# Patient Record
Sex: MALE | Race: WHITE | Employment: FULL TIME | URBAN - METROPOLITAN AREA
[De-identification: names, ages, dates, MRNs, and addresses within clinical notes are randomized per-mention and may not be internally consistent; named-entity substitution may affect disease eponyms.]

---

## 2024-01-04 ENCOUNTER — OFFICE VISIT (OUTPATIENT)
Dept: PRIMARY CARE | Facility: CLINIC | Age: 29
End: 2024-01-04
Payer: COMMERCIAL

## 2024-01-04 VITALS
DIASTOLIC BLOOD PRESSURE: 70 MMHG | SYSTOLIC BLOOD PRESSURE: 116 MMHG | BODY MASS INDEX: 27.06 KG/M2 | WEIGHT: 189 LBS | OXYGEN SATURATION: 97 % | HEIGHT: 70 IN | HEART RATE: 80 BPM

## 2024-01-04 DIAGNOSIS — N50.89 TESTICLE LUMP: ICD-10-CM

## 2024-01-04 DIAGNOSIS — R59.0 SUBMANDIBULAR LYMPHADENOPATHY: Primary | ICD-10-CM

## 2024-01-04 PROCEDURE — 99213 OFFICE O/P EST LOW 20 MIN: CPT | Performed by: STUDENT IN AN ORGANIZED HEALTH CARE EDUCATION/TRAINING PROGRAM

## 2024-01-04 RX ORDER — CETIRIZINE HYDROCHLORIDE 10 MG/1
TABLET ORAL EVERY 24 HOURS
COMMUNITY

## 2024-01-04 RX ORDER — ACETAMINOPHEN 325 MG/1
TABLET ORAL EVERY 4 HOURS
COMMUNITY

## 2024-01-04 RX ORDER — PENICILLIN V POTASSIUM 500 MG/1
TABLET, FILM COATED ORAL
COMMUNITY
Start: 2023-12-31

## 2024-01-04 RX ORDER — CHLORHEXIDINE GLUCONATE ORAL RINSE 1.2 MG/ML
SOLUTION DENTAL
COMMUNITY
Start: 2023-12-31

## 2024-01-04 ASSESSMENT — COLUMBIA-SUICIDE SEVERITY RATING SCALE - C-SSRS
6. HAVE YOU EVER DONE ANYTHING, STARTED TO DO ANYTHING, OR PREPARED TO DO ANYTHING TO END YOUR LIFE?: NO
1. IN THE PAST MONTH, HAVE YOU WISHED YOU WERE DEAD OR WISHED YOU COULD GO TO SLEEP AND NOT WAKE UP?: NO
2. HAVE YOU ACTUALLY HAD ANY THOUGHTS OF KILLING YOURSELF?: NO

## 2024-01-04 ASSESSMENT — PAIN SCALES - GENERAL: PAINLEVEL: 0-NO PAIN

## 2024-01-04 ASSESSMENT — PATIENT HEALTH QUESTIONNAIRE - PHQ9
2. FEELING DOWN, DEPRESSED OR HOPELESS: NOT AT ALL
1. LITTLE INTEREST OR PLEASURE IN DOING THINGS: NOT AT ALL
SUM OF ALL RESPONSES TO PHQ9 QUESTIONS 1 AND 2: 0

## 2024-01-04 NOTE — PROGRESS NOTES
"Subjective   Patient ID: Villa Avila is a 28 y.o. male who presents for swollen lymph nodes in neck and small lump in testicle.    HPI.  27 yo male here for swollen lymph nodes in neck and lump in testicle  Swollen lymph node in R and midline neck  Took antibiotics, improved somewhat  Now enlarging more  No tooth infection  Did have dental filling done in August on that side  Lymph nodes somewhat tender  Does smoke  2. B/L lumps in testicles  Notices in morning  Nonpainful  For a couple months  Seems like it's on the vein    Review of Systems  All pertinent positive symptoms are included in the history of present illness.    All other systems have been reviewed and are negative and noncontributory to this patient's current ailments.     No Known Allergies       There is no immunization history on file for this patient.    Objective   Vitals:    01/04/24 1456   BP: 116/70   Pulse: 80   SpO2: 97%   Weight: 85.7 kg (189 lb)   Height: 1.778 m (5' 10\")       Physical Exam  CONSTITUTIONAL - well nourished, well developed, looks like stated age, in no acute distress  SKIN - normal skin color and pigmentation, normal skin turgor without rash  HEAD - Submandibular lymph nodes palpated on right and midline  EYES - extraocular muscles are intact  ENT - atraumatic  NECK - no neck mass was observed  LUNGS - CTA B/L  CARDIAC - regular rate and regular rhythm, no skipped beats, no murmur appreciated  ABDOMEN - no organomegaly, soft, nontender, nondistended   - .5cm nodules palpated in testicles B/L  EXTREMITIES - no edema, no deformities  MSK - moves limbs equally  NEUROLOGICAL - normal gait, normal balance, alert, oriented and no focal signs  PSYCHIATRIC - alert, pleasant and cordial, age-appropriate    Assessment/Plan   27 yo male here for swollen lymph nodes in neck and lump in testicle  Swollen lymph node in R and midline neck  Head and neck US  2. B/L lumps in testicles, likely varicoceles  Scrotal US    RTC as " needed

## 2024-01-05 ENCOUNTER — HOSPITAL ENCOUNTER (OUTPATIENT)
Dept: RADIOLOGY | Facility: HOSPITAL | Age: 29
Discharge: HOME | End: 2024-01-05
Payer: COMMERCIAL

## 2024-01-05 ENCOUNTER — TELEPHONE (OUTPATIENT)
Dept: PRIMARY CARE | Facility: CLINIC | Age: 29
End: 2024-01-05
Payer: COMMERCIAL

## 2024-01-05 ENCOUNTER — APPOINTMENT (OUTPATIENT)
Dept: PRIMARY CARE | Facility: CLINIC | Age: 29
End: 2024-01-05
Payer: COMMERCIAL

## 2024-01-05 DIAGNOSIS — N50.89 TESTICLE LUMP: ICD-10-CM

## 2024-01-05 DIAGNOSIS — R59.0 SUBMANDIBULAR LYMPHADENOPATHY: ICD-10-CM

## 2024-01-05 PROCEDURE — 93975 VASCULAR STUDY: CPT

## 2024-01-05 PROCEDURE — 76536 US EXAM OF HEAD AND NECK: CPT

## 2024-01-09 ENCOUNTER — TELEMEDICINE (OUTPATIENT)
Dept: PRIMARY CARE | Facility: CLINIC | Age: 29
End: 2024-01-09
Payer: COMMERCIAL

## 2024-01-09 VITALS — BODY MASS INDEX: 27.12 KG/M2 | WEIGHT: 189 LBS

## 2024-01-09 DIAGNOSIS — R10.11 RIGHT UPPER QUADRANT PAIN: Primary | ICD-10-CM

## 2024-01-09 PROCEDURE — 99213 OFFICE O/P EST LOW 20 MIN: CPT | Performed by: STUDENT IN AN ORGANIZED HEALTH CARE EDUCATION/TRAINING PROGRAM

## 2024-01-09 ASSESSMENT — PATIENT HEALTH QUESTIONNAIRE - PHQ9
SUM OF ALL RESPONSES TO PHQ9 QUESTIONS 1 AND 2: 0
1. LITTLE INTEREST OR PLEASURE IN DOING THINGS: NOT AT ALL
2. FEELING DOWN, DEPRESSED OR HOPELESS: NOT AT ALL

## 2024-01-09 ASSESSMENT — PAIN SCALES - GENERAL: PAINLEVEL: 4

## 2024-01-09 NOTE — PROGRESS NOTES
"Subjective   Patient ID: Villa Avila is a 28 y.o. male who presents virtually for Breast Mass (Pt would like to discuss the lumps on the right side of rib cage, which he noticed for a couple of months, which is tender to the touch  / nr).    HPI  29 yo male presents virtually for breast mass  Lump on bottom right of ribcage  Noticed for a couple months  Tender to touch  Feels a bump  Gets a \"jolt\" when he hits his side on something  Feels like its on the skin above rib  Feels soft like a cyst  No nausea/vomiting  For a couple months  Short, self limiting pain  Notices about once per week    Review of Systems  All pertinent positive symptoms are included in the history of present illness.    All other systems have been reviewed and are negative and noncontributory to this patient's current ailments.     No Known Allergies       There is no immunization history on file for this patient.    Objective   Vitals:    01/09/24 1022   Weight: 85.7 kg (189 lb)       Physical Exam Virtual visit  CONSTITUTIONAL - well nourished, well developed, looks like stated age, in no acute distress, not ill-appearing, and not tired appearing  SKIN - normal skin color and pigmentation, normal skin turgor without rash, lesions, or nodules visualized  HEAD - no trauma, normocephalic  ENT - atraumatic  CHEST - non labored breathing  PSYCHIATRIC - alert, pleasant and cordial, age-appropriate    Assessment/Plan   29 yo male presents virtually for breast mass  RUQ pain  RUQ ultrasound    RTC as needed  "

## 2024-01-12 ENCOUNTER — HOSPITAL ENCOUNTER (OUTPATIENT)
Dept: RADIOLOGY | Facility: HOSPITAL | Age: 29
Discharge: HOME | End: 2024-01-12
Payer: COMMERCIAL

## 2024-01-12 ENCOUNTER — TELEPHONE (OUTPATIENT)
Dept: PRIMARY CARE | Facility: CLINIC | Age: 29
End: 2024-01-12
Payer: COMMERCIAL

## 2024-01-12 DIAGNOSIS — R10.11 RIGHT UPPER QUADRANT PAIN: ICD-10-CM

## 2024-01-12 PROCEDURE — 76705 ECHO EXAM OF ABDOMEN: CPT

## 2024-07-05 ENCOUNTER — OFFICE VISIT (OUTPATIENT)
Dept: PRIMARY CARE | Facility: CLINIC | Age: 29
End: 2024-07-05
Payer: COMMERCIAL

## 2024-07-05 VITALS
OXYGEN SATURATION: 98 % | BODY MASS INDEX: 28.01 KG/M2 | SYSTOLIC BLOOD PRESSURE: 114 MMHG | DIASTOLIC BLOOD PRESSURE: 68 MMHG | WEIGHT: 195.2 LBS | HEART RATE: 75 BPM

## 2024-07-05 DIAGNOSIS — R42 DIZZINESS: Primary | ICD-10-CM

## 2024-07-05 DIAGNOSIS — J30.89 ALLERGIC RHINITIS DUE TO OTHER ALLERGIC TRIGGER, UNSPECIFIED SEASONALITY: ICD-10-CM

## 2024-07-05 PROCEDURE — 99214 OFFICE O/P EST MOD 30 MIN: CPT | Performed by: PHYSICIAN ASSISTANT

## 2024-07-05 PROCEDURE — 4004F PT TOBACCO SCREEN RCVD TLK: CPT | Performed by: PHYSICIAN ASSISTANT

## 2024-07-05 ASSESSMENT — ENCOUNTER SYMPTOMS
DIZZINESS: 1
SHORTNESS OF BREATH: 0
PALPITATIONS: 0

## 2024-07-05 ASSESSMENT — PATIENT HEALTH QUESTIONNAIRE - PHQ9
SUM OF ALL RESPONSES TO PHQ9 QUESTIONS 1 AND 2: 0
2. FEELING DOWN, DEPRESSED OR HOPELESS: NOT AT ALL
1. LITTLE INTEREST OR PLEASURE IN DOING THINGS: NOT AT ALL

## 2024-07-05 ASSESSMENT — COLUMBIA-SUICIDE SEVERITY RATING SCALE - C-SSRS
2. HAVE YOU ACTUALLY HAD ANY THOUGHTS OF KILLING YOURSELF?: NO
6. HAVE YOU EVER DONE ANYTHING, STARTED TO DO ANYTHING, OR PREPARED TO DO ANYTHING TO END YOUR LIFE?: NO
1. IN THE PAST MONTH, HAVE YOU WISHED YOU WERE DEAD OR WISHED YOU COULD GO TO SLEEP AND NOT WAKE UP?: NO

## 2024-07-05 ASSESSMENT — PAIN SCALES - GENERAL: PAINLEVEL: 0-NO PAIN

## 2024-07-05 NOTE — PROGRESS NOTES
Subjective   Patient ID: Villa Avila is a 28 y.o. male who presents for Dizziness (Pt is here to discuss dizziness, that lasts all day, since Sunday / nr).    29 y/o male presents for c/o dizziness and light headedness for 5 days. Has been constant and does have some some light sensitivity. Has been working without issue. Has had some balance issues but no falls or issues driving. Did have one headache since resolved. Has had a lot of allergies lately as well and admits to spinning room with position change.     Dizziness  Associated symptoms include congestion. Pertinent negatives include no chest pain.        Review of Systems   HENT:  Positive for congestion.    Respiratory:  Negative for shortness of breath.    Cardiovascular:  Negative for chest pain and palpitations.   Neurological:  Positive for dizziness.   All other systems reviewed and are negative.      Objective   /68   Pulse 75   Wt 88.5 kg (195 lb 3.2 oz)   SpO2 98%   BMI 28.01 kg/m²     Physical Exam  Constitutional:       Appearance: Normal appearance.   HENT:      Right Ear: Tympanic membrane normal.      Left Ear: Tympanic membrane normal.      Nose: Congestion present.   Eyes:      Pupils: Pupils are equal, round, and reactive to light.   Cardiovascular:      Rate and Rhythm: Normal rate and regular rhythm.      Heart sounds: Normal heart sounds.   Pulmonary:      Breath sounds: Normal breath sounds.   Neurological:      Mental Status: He is alert.         Assessment/Plan   Diagnoses and all orders for this visit:  Dizziness  Allergic rhinitis due to other allergic trigger, unspecified seasonality    Continue antihistamines, recommend adding steroid nasal spray. Consider Epley maneuvers if not improving.

## 2024-07-17 ENCOUNTER — OFFICE VISIT (OUTPATIENT)
Dept: PRIMARY CARE | Facility: CLINIC | Age: 29
End: 2024-07-17
Payer: COMMERCIAL

## 2024-07-17 VITALS
OXYGEN SATURATION: 97 % | WEIGHT: 200 LBS | SYSTOLIC BLOOD PRESSURE: 132 MMHG | HEART RATE: 90 BPM | HEIGHT: 70 IN | BODY MASS INDEX: 28.63 KG/M2 | DIASTOLIC BLOOD PRESSURE: 76 MMHG

## 2024-07-17 DIAGNOSIS — R42 VERTIGO: Primary | ICD-10-CM

## 2024-07-17 DIAGNOSIS — H69.93 DYSFUNCTION OF BOTH EUSTACHIAN TUBES: ICD-10-CM

## 2024-07-17 PROCEDURE — 99214 OFFICE O/P EST MOD 30 MIN: CPT | Performed by: PHYSICIAN ASSISTANT

## 2024-07-17 PROCEDURE — 4004F PT TOBACCO SCREEN RCVD TLK: CPT | Performed by: PHYSICIAN ASSISTANT

## 2024-07-17 PROCEDURE — 3008F BODY MASS INDEX DOCD: CPT | Performed by: PHYSICIAN ASSISTANT

## 2024-07-17 RX ORDER — PREDNISONE 10 MG/1
TABLET ORAL
Qty: 30 TABLET | Refills: 0 | Status: SHIPPED | OUTPATIENT
Start: 2024-07-17 | End: 2024-07-27

## 2024-07-17 ASSESSMENT — PAIN SCALES - GENERAL: PAINLEVEL: 0-NO PAIN

## 2024-07-17 ASSESSMENT — ENCOUNTER SYMPTOMS: DIZZINESS: 1

## 2024-07-17 NOTE — PROGRESS NOTES
"Subjective   Patient ID: Villa Avila is a 28 y.o. male who presents for Dizziness (Patient states he gets dizzy when he stands up and sit down as well when he moves his head in different direction//bp ).    Presents for c/o ongoing dizziness - using nasal spray without much help. Dizziness provoked by position change and head movements.   Denies severe nausea or vomiting.       Dizziness         Review of Systems   Neurological:  Positive for dizziness.   All other systems reviewed and are negative.      Objective   /76 (BP Location: Left arm, Patient Position: Sitting)   Pulse 90   Ht 1.778 m (5' 10\")   Wt 90.7 kg (200 lb)   SpO2 97%   BMI 28.70 kg/m²     Physical Exam  Constitutional:       Appearance: Normal appearance.   HENT:      Right Ear: Tympanic membrane normal.      Left Ear: Tympanic membrane normal.      Ears:      Comments: TM pressure B/L R>L     Nose: Congestion present.   Eyes:      Pupils: Pupils are equal, round, and reactive to light.   Neck:      Vascular: No carotid bruit.   Cardiovascular:      Rate and Rhythm: Normal rate and regular rhythm.   Neurological:      Mental Status: He is alert.         Assessment/Plan   Diagnoses and all orders for this visit:  Vertigo  -     predniSONE (Deltasone) 10 mg tablet; Take 5 tablets (50 mg) by mouth once daily for 2 days, THEN 4 tablets (40 mg) once daily for 2 days, THEN 3 tablets (30 mg) once daily for 2 days, THEN 2 tablets (20 mg) once daily for 2 days, THEN 1 tablet (10 mg) once daily for 2 days.  Dysfunction of both eustachian tubes  -     predniSONE (Deltasone) 10 mg tablet; Take 5 tablets (50 mg) by mouth once daily for 2 days, THEN 4 tablets (40 mg) once daily for 2 days, THEN 3 tablets (30 mg) once daily for 2 days, THEN 2 tablets (20 mg) once daily for 2 days, THEN 1 tablet (10 mg) once daily for 2 days.         "

## 2024-07-25 ENCOUNTER — OFFICE VISIT (OUTPATIENT)
Dept: OTOLARYNGOLOGY | Facility: CLINIC | Age: 29
End: 2024-07-25
Payer: COMMERCIAL

## 2024-07-25 VITALS — BODY MASS INDEX: 28.7 KG/M2 | WEIGHT: 200 LBS

## 2024-07-25 DIAGNOSIS — J31.0 CHRONIC RHINITIS: Primary | ICD-10-CM

## 2024-07-25 DIAGNOSIS — M54.2 NECK PAIN: ICD-10-CM

## 2024-07-25 DIAGNOSIS — R42 VERTIGO: ICD-10-CM

## 2024-07-25 PROCEDURE — 99203 OFFICE O/P NEW LOW 30 MIN: CPT | Performed by: GENERAL PRACTICE

## 2024-07-28 NOTE — PROGRESS NOTES
Otolaryngology - Head and Neck Surgery Outpatient New Patient Visit Note        Assessment/Plan:   Problem List Items Addressed This Visit    None  Visit Diagnoses         Codes    Chronic rhinitis    -  Primary J31.0    Vertigo     R42    Relevant Orders    Referral to Audiology    Cervical +Ocular VEMP    Electronystagmography    V-Hit    Neck pain     M54.2    Relevant Orders    Referral to Physical Therapy            28yoM with recent dizziness for approx 1mo which is gradually improving.   Notes some association with headache as well as neck tension.   Suspect  some component of cervicogenic dizziness and/or atypical migraines with vertigo.  Will refer to PT to aid in management.   Discussed possible audio/VNG and pt would like to proceed, to help rule out peripheral vestibulopathies.     Also with some chronic rhinitis, discussed use of saline irrigations and saline gel to aid in clearance of debris (pt works in dirty/[employer] environment).              Follow up:  -plan for follow up in clinic as needed and after completion of ordered studies    All of the above findings, impressions, treatment planning and follow up plans were discussed with the patient who indicated understanding.  the patient was instructed to contact or return to clinic sooner if symptoms/signs persist or worsen despite the above management.      Renan Mercedes MD  Otolaryngology - Head and Neck Surgery            History Of Present Illness  Villa Avila is a 28 y.o. male presenting for evaluationof dizziness.    Reports >1mo of symptoms of waxing/waning dizziness.   No inciting illness/trauma.    Reports awakening with dizziness, as well a some headache (pressure, diffuse).   Some association with sinus pressure/congestion as well.     Notes dizziness and dysequilibrium more than RS vertigo.  Not presyncope    No associated aural fullness, hearing change or tinnitus.    Reports some history of bothersome neck tension for approx 6mo, and  notes neck tension and neck extension contribute to dizziness.   Notes some light sensitivity with dizziness, but no visual aura or priro hsitory of migraines.    Reports some mild dizziness/vertigo last year, but otherwise no significant prir history.    Reports treatment by PCM with flonase, prednisone as well as meclizine without significant relief.       Reports some chronic rhinitis which is associated with significant dust/debris exposure at work as a .                 Past Medical History  He has no past medical history on file.    Surgical History  He has no past surgical history on file.     Social History  He reports that he has quit smoking. His smoking use included cigarettes. He has a 5 pack-year smoking history. He has never used smokeless tobacco. He reports current alcohol use of about 1.0 standard drink of alcohol per week. No history on file for drug use.    Family History  No family history on file.     Allergies  Patient has no known allergies.    Review of Systems  ROS: Pertinent positives as noted in HPI.    - CONSTITUTIONAL: Does not report weight loss, fever or chills.    - HEENT:   Ear: Does not report tinnitus,  , hearing loss, otalgia, otorrhea  Nose: Does not report  ,  , epistaxis, decreased smell  Throat: Does not report pain, dysphagia, odynophagia  Larynx: Does not report hoarseness,  difficulty breathing, pain with speaking (odynophonia)  Neck: Does not report new masses, pain, swelling  Face: Does not report sinus pain, pressure, swelling, numbness, weakness     - RESPIRATORY: Does not report SOB or cough.    - CV: Does not report palpitations or chest pain.     - GI: Does not report abdominal pain, nausea, vomiting or diarrhea.    - : Does not report dysuria or urinary frequency.    - MSK: Does not report myalgia or joint pain.    - SKIN: Does not report rash or pruritus.    - NEUROLOGICAL: Does not report headache or syncope.    - PSYCHIATRIC: Does not report recent  changes in mood. Does not report anxiety or depression.         Physical Exam:     GENERAL:   Alert & Oriented to person, place and time; Normal affect and appearance. Well developed and well nourished. Conversant & cooperative with examination.     HEAD:   Normocephalic, atraumatic. No sinus tenderness to palpation. Normal parotid bilaterally. Normal facial strength.     NEUROLOGIC:   Cranial nerves II-XII grossly intact, gait WNL. Normal mood and affect.    EYES:   Extraocular movements intact. Pupils equal, round, reactive to light and accommodation. No nystagmus, no ptosis. no scleral injection.    EAR:   Normal auricle. No discomfort or TTP with manipulation.   Handheld otoscopic exam showed normal external auditory canals bilaterally. No purulence or EAC inflammation. Minimal cerumen.   Right tympanic membrane clear and mobile without evidence of perforation, retraction or middle ear effusion.   Left tympanic membrane clear and mobile without evidence of perforation, retraction or middle ear effusion.     NOSE:   No external deformity. No external nasal lesions, lacerations, or scars. Nasal tip symmetrical with normal nasal valves.   Nasal cavity with essentially midline septum, normal mucosa and turbinates. No lesions, masses, purulence or polyps.     OC/OP:   Mucous membranes moist, no masses, lesions or exudates.   Normal tongue, floor of mouth, teeth, gums, lips. Normal posterior pharyngeal wall.    Normal tonsils without erythema, exudate or obvious calculi     NECK:   No neck masses or thyroid enlargement. Trachea midline. No tenderness to palpation    LYMPHATIC:   No cervical lymphadenopathy.     RESPIRATORY:   Symmetric chest elevation & no retractions. No significant hoarseness. No increased work of breathing.    CV:   No clubbing or cyanosis. No obvious edema    Skin:   No facial rashes, vesicles or lesions.     Extremities:   No gross abnormalities      Clinic Procedure        Information  review:  External sources (notes, imaging, lab results) listed below personally reviewed to aid in medical decision making process.  -  -  -

## 2024-08-07 ENCOUNTER — EVALUATION (OUTPATIENT)
Dept: PHYSICAL THERAPY | Facility: CLINIC | Age: 29
End: 2024-08-07
Payer: COMMERCIAL

## 2024-08-07 DIAGNOSIS — R42 DIZZINESS: Primary | ICD-10-CM

## 2024-08-07 DIAGNOSIS — M54.2 NECK PAIN: ICD-10-CM

## 2024-08-07 PROCEDURE — 97161 PT EVAL LOW COMPLEX 20 MIN: CPT | Mod: GP

## 2024-08-07 PROCEDURE — 97110 THERAPEUTIC EXERCISES: CPT | Mod: GP

## 2024-08-07 NOTE — PROGRESS NOTES
Physical Therapy Evaluation     Patient Name: Villa Avila  MRN: [id number]  Today's Date: 8/7/2024  Time Calculation  Start Time: 1039  Stop Time: 1117  Time Calculation (min): 38 min      Insurance:  Number of Treatments Authorized: 1/?? (ref [id number] 3000 ded 80/20 coverage 20 V a yr   AUTH IS REQUIRED)          Current Problem:   1. Dizziness  Follow Up In Physical Therapy      2. Neck pain  Referral to Physical Therapy    Follow Up In Physical Therapy          Precautions:  Precautions  Precautions Comment: No Fall risk    Reason for visit: Dizziness  Referred by: Dr. Mercedes    Work, mechanical stresses:  full time  Leisure, mechanical stresses: Kayak/boat/fish - unable because of this   Prior to onset the pt was able to complete all work/leisure/functional activities without limitation.  Functional disability from present episode/Primary goal for PT: None stated can impact vision./Most concerned with the improving/resolving the dizziness.  Present symptoms: Dizziness, R sided neck pain   Present since: 6/30/24 and getting unchanging.  Had onset of neck pain in March 2024  Commenced for no apparent reason  Symptoms at onset: Headache and dizziness   Constant symptoms: Dizziness and neck pain R > L   Intermittent symptoms: Headache - Reports good/bad days.    Pain ranges from: 4-6/10   Pt describes pain as: Throbbing/Burning  in neck, Fuzziness/stuffy/dizziness     Symptoms are worse with: If neck pain is bad dizziness and Headache will increase as well, bending or looking up - Always, turning- R or L, Lights - always lying - always  Symptoms are better with: sitting in dark - always   Disturbed sleep:  every few hours every night.  Previous episodes: None, gets episodic neck pain but its minimal and he associates it with prolonged abnormal head positions at work. Has never sought treatment for it.   Previous treatments: Saw ENT. Has a hearing test scheduled. Prednisone  for 10 days and nasals - did  not seem to have an effect.  Did not do PT.  - did attempt Lucia cueto to thr R but denied an effect.  Imaging: none  Tinnitus - intermittent and started around the same times this episode, MD is aware /nausea - no/swallowing - no   Red Flags: recent/major surgery - Feb 2024 wisdom teeth removed , night pain - no, accidents - no, unexplained weight loss - no  Medical History:Overall good health     Objective Findings:  Outcome Measures:  Other Measures  Neck Disability Index: 38%  Posture: Fair  Correction: Worse   Lateral shift: nil  Eye movements tracking finger horizontal and vertical - increase dizziness   No Nystagmus observed with eye movements or repeated cervical movements.    Cervical Spine movement Loss - Nil/Min/Mod/Max limit or degrees  Retraction: min - dizziness and neck pain at end range  Protraction: nil - ERP neck   Flexion: min - dizziness  Extension: nil  R rot: min  L rot: nil  R SB: nil  L SB: min - neck and dizziness    Repeated Movement Testing -  During/after/mechanical response:  Sx in sitting: neck pain   Pro: x 10 - increase pain/dizziness, W  Ret: x 10 - increase pain and dizziness, NW   R rot: x 10  increase, pain/dizziness, W  L rot x 10 - NE, Increase dizziness, W     Treatment:   Sitting with roll  Educated pt on proper response to exercise, centralization/localization, produce/increase - NW principle, and assessment process.  Issued handout for HEP  HEP/med bridge:   Access Code: [email or web address]  URL: [email or web address]  Date: 08/07/2024  Prepared by: Richard Eaton  Exercises  - Seated Cervical Retraction  - 5-8 x daily - 7 x weekly - 1 sets - 10-20 reps  - Seated Correct Posture  - 1 x daily - 7 x weekly - 2-3 sets - 10-20 reps    Assessment: Pt presents to PT with complaint of Dizziness and headaches that began on 6/30/24 without APURVA. Pt's history and response to repeated movements makes most likely classification BPPV but c/s derangement must be ruled out. Pt  will continue to be assessed over the next 2-3 sessions to confirm provisional classification and DP. Pt will benefit from skilled PT to address ROM/strength/functional limitations and pain noted during evaluation today.    Plan of Care:  Problem List:Pain, Functional limitations, activity limitations, ROM limitations, Posture, Transfer, Activity Limitations, IADLS/ADLS/Self care  C/S goals:  STG:  Dizziness decreased by >/= 50-% intensity and frequency by week 4  Pt will demo proper position/use of lumbar roll without cues by week 1  Pt will be compliant with HEP by week 1  Pt will be able to back out of his driveway/change lanes R or L without increase in dizziness by week 4  Pt will be able to maintain various head positions throguhout his entire work dayl without increase in dizziness > /= 2 days a week by week 4  LTG:   C/S ROM = nil limit in all directions without production of dizziness by week 8  Pt will achieve a clinically significant improvement in NDI by week 8  Pt will report >/= 80% improvement/progress towards PT goals by week 8  Pt will be able to sleep >/= 5 nights a week without interruption secondary to dizziness/neck pain/headaches by week 8  Pt will be able to return to all leisure activities without limitation from dizziness by week 8  Planned interventions: Ther ex, Neuromuscular re-ed, ther act, Manual, Education, Home program, Estim, Hot therapy, Cold therapy, Vaso  The POC was created, discussed, and agreed on with the patient.

## 2024-08-08 ENCOUNTER — OFFICE VISIT (OUTPATIENT)
Dept: PRIMARY CARE | Facility: CLINIC | Age: 29
End: 2024-08-08
Payer: COMMERCIAL

## 2024-08-08 VITALS
SYSTOLIC BLOOD PRESSURE: 128 MMHG | OXYGEN SATURATION: 97 % | BODY MASS INDEX: 28.98 KG/M2 | HEART RATE: 91 BPM | WEIGHT: 202 LBS | DIASTOLIC BLOOD PRESSURE: 74 MMHG

## 2024-08-08 DIAGNOSIS — M54.2 CERVICAL PAIN: ICD-10-CM

## 2024-08-08 DIAGNOSIS — R20.0 ARM NUMBNESS: ICD-10-CM

## 2024-08-08 DIAGNOSIS — R42 DIZZINESS: Primary | ICD-10-CM

## 2024-08-08 DIAGNOSIS — H53.9 VISION CHANGES: ICD-10-CM

## 2024-08-08 DIAGNOSIS — R51.9 CHRONIC DAILY HEADACHE: ICD-10-CM

## 2024-08-08 PROCEDURE — 99214 OFFICE O/P EST MOD 30 MIN: CPT | Performed by: PHYSICIAN ASSISTANT

## 2024-08-08 PROCEDURE — 4004F PT TOBACCO SCREEN RCVD TLK: CPT | Performed by: PHYSICIAN ASSISTANT

## 2024-08-08 ASSESSMENT — PATIENT HEALTH QUESTIONNAIRE - PHQ9
1. LITTLE INTEREST OR PLEASURE IN DOING THINGS: NOT AT ALL
2. FEELING DOWN, DEPRESSED OR HOPELESS: NOT AT ALL
SUM OF ALL RESPONSES TO PHQ9 QUESTIONS 1 AND 2: 0

## 2024-08-08 ASSESSMENT — PAIN SCALES - GENERAL: PAINLEVEL: 0-NO PAIN

## 2024-08-08 NOTE — PROGRESS NOTES
Subjective   Patient ID: Villa Avila is a 28 y.o. male who presents for Follow-up (Pt is here to fu on continued migraines and dizziness. ).    27 y/o male seen for follow up - continues with dizziness and has headaches. 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   Triggers - head movements.   Alleviating factors: generic migraine relief medication. Does get nausea without vomiting. Has some light sensitivity.   Headaches are new for him and does wake him up at night.   Does have numbness in arms, shakiness, weakness. Had remote neck pain several years ago which resolved. Admits to blurred vision at times. Does not wear/need corrective lenses.        Review of Systems   All other systems reviewed and are negative.      Objective   /74   Pulse 91   Wt 91.6 kg (202 lb)   SpO2 97%   BMI 28.98 kg/m²     Physical Exam  Constitutional:       Appearance: Normal appearance.   HENT:      Right Ear: Tympanic membrane normal.      Left Ear: Tympanic membrane normal.      Nose: Nose normal.      Mouth/Throat:      Pharynx: Oropharynx is clear.   Eyes:      Pupils: Pupils are equal, round, and reactive to light.      Comments: L sided horiz nystag   Cardiovascular:      Rate and Rhythm: Normal rate and regular rhythm.      Heart sounds: Normal heart sounds.   Pulmonary:      Breath sounds: Normal breath sounds.   Musculoskeletal:      Cervical back: Normal range of motion and neck supple. No rigidity or tenderness.   Neurological:      Mental Status: He is alert and oriented to person, place, and time.      Motor: No weakness.      Coordination: Coordination abnormal.      Gait: Gait normal.      Deep Tendon Reflexes: Reflexes normal.   Psychiatric:         Mood and Affect: Mood normal.         Behavior: Behavior normal.       Assessment/Plan   Diagnoses and all orders for this visit:  Dizziness  -     MR brain w and wo IV contrast; Future  -     MR cervical spine wo IV contrast;  Future  Arm numbness  -     MR brain w and wo IV contrast; Future  -     MR cervical spine wo IV contrast; Future  Chronic daily headache  -     MR brain w and wo IV contrast; Future  Cervical pain  -     MR cervical spine wo IV contrast; Future  Vision changes  -     MR brain w and wo IV contrast; Future

## 2024-08-13 ENCOUNTER — APPOINTMENT (OUTPATIENT)
Dept: PHYSICAL THERAPY | Facility: CLINIC | Age: 29
End: 2024-08-13
Payer: COMMERCIAL

## 2024-08-15 ENCOUNTER — APPOINTMENT (OUTPATIENT)
Dept: PHYSICAL THERAPY | Facility: CLINIC | Age: 29
End: 2024-08-15
Payer: COMMERCIAL

## 2024-08-16 ENCOUNTER — APPOINTMENT (OUTPATIENT)
Dept: PHYSICAL THERAPY | Facility: CLINIC | Age: 29
End: 2024-08-16
Payer: COMMERCIAL

## 2024-08-20 ENCOUNTER — APPOINTMENT (OUTPATIENT)
Dept: PRIMARY CARE | Facility: CLINIC | Age: 29
End: 2024-08-20
Payer: COMMERCIAL

## 2024-08-20 ENCOUNTER — APPOINTMENT (OUTPATIENT)
Dept: PHYSICAL THERAPY | Facility: CLINIC | Age: 29
End: 2024-08-20
Payer: COMMERCIAL

## 2024-08-23 ENCOUNTER — HOSPITAL ENCOUNTER (OUTPATIENT)
Dept: RADIOLOGY | Facility: HOSPITAL | Age: 29
Discharge: HOME | End: 2024-08-23
Payer: COMMERCIAL

## 2024-08-23 DIAGNOSIS — R51.9 CHRONIC DAILY HEADACHE: ICD-10-CM

## 2024-08-23 DIAGNOSIS — H53.9 VISION CHANGES: ICD-10-CM

## 2024-08-23 DIAGNOSIS — R20.0 ARM NUMBNESS: ICD-10-CM

## 2024-08-23 DIAGNOSIS — R42 DIZZINESS: ICD-10-CM

## 2024-08-23 DIAGNOSIS — M54.2 CERVICAL PAIN: ICD-10-CM

## 2024-08-23 PROCEDURE — 70551 MRI BRAIN STEM W/O DYE: CPT

## 2024-08-23 PROCEDURE — 72141 MRI NECK SPINE W/O DYE: CPT

## 2024-08-26 ENCOUNTER — DOCUMENTATION (OUTPATIENT)
Dept: PHYSICAL THERAPY | Facility: CLINIC | Age: 29
End: 2024-08-26
Payer: COMMERCIAL

## 2024-09-03 NOTE — PROGRESS NOTES
ADULT BALANCE FUNCTION TEST (BFT)      Name:  Villa Avila  :  1995  Age:  29 y.o.  Date of Evaluation:  ***    Patient's VEMP testing seen in conjunction with VNG & vHIT testing. Please refer to same day provider note for further details.      MYA Bowser, CCC-A CCVR  Senior Clinical Vestibular Audiologist

## 2024-09-03 NOTE — PROGRESS NOTES
ADULT BALANCE FUNCTION TEST (BFT)    ***Suspect  some component of cervicogenic dizziness and/or atypical migraines with vertigo.  ***TOBACCO USE  Name:  Villa Avila  :  1995  Age:  29 y.o.  Date of Evaluation:  ***    IMPRESSIONS     Overall normal vestibular examination; no evidence of active vestibular system involvement to account for patient reported symptoms. There were no indications of peripheral or central vestibular system pathway involvement. There were no indications of active Benign Paroxysmal Positional Vertigo (BPPV) present. Normal observation of gait and transfers. Bedside postural control findings demonstrate normal functional balance with varying sensory information measured on the mCTSIB. Patient's risk of falling based on today's evaluation is low.    RECOMMENDATIONS     Consider re-evaluation as medically indicated.  Maintain a healthy lifestyle to help body function overall.  Continue monitoring per ENT/PCP preference.    Time: ***    HISTORY     Patient was seen for Balance Function Testing (BFT) due to a history of dizziness/imbalance. Vestibular case history collected via patient-clinician interview, patient chart review, and patient questionnaires.    Patient reported history of dizziness described as ***.  Symptoms began 2024.  Episodes occur *** and last several *** before subsiding.  Most recent episode occurred ***.  Associated symptoms include neck tension, headaches.  Symptoms are provoked by ***.  Symptoms are alleviated by ***.  Overall the patient's symptoms have improved over time.  This patient has had a total of *** falls due to their symptoms.   Denied any symptoms provoked by sneezing or coughing, as well as any presence of autophony.   Denied any recent medication changes.   Relevant medical history includes ***.  Auditory complaints include ***.  Patient reported complying with pre-test instructions.     EVALUATION     See VNG, vHIT, & VEMP Raw Data in  "\"Media\"    TEST RESULTS     BEDSIDE ASSESSMENT TEST  The bedside assessment is an optional portion of the test battery to further assist in differential diagnosis and screen for eye abnormalities which may affect testing.    Otoscopy: {CERUMEN2:25006}  Tympanometry: {TYMPSC:60744::\"Tympanometry revealed normal ear canal volume, peak pressure, and compliance, consistent with normal middle ear function (Type A).\"} ***  Extra-Ocular Range of Motion: {ROM:91300::\"normal.\"}Extra-Ocular Range of Motion is performed to evaluate any eye abnormalities prior to testing.  Cover/Uncover: {COVER/UNCOVER:62451::\"normal.\"} Cover/Uncover test is performed to evaluate for skewed deviation of the eyes prior to testing.  Cervical Neck Exam: {NECKEXAM:09068::\"normal.\"} Cervical Neck is performed to evaluate any restrictions or pain with neck movement prior to testing.  Vertebral Artery Screen: {VAS:13284::\"normal.\"} Vertebral Artery Screen is performed to evaluate for vertebrobasilar insufficiency prior to testing.   Ocular Counter-Roll: {OCR:80554::\"normal.\"} Ocular Counter-Roll is performed to evaluate ocular tilt reaction and assess otolith organ function prior to testing.  VOR Cancellation: {VORC:82692::\"normal.\"} VOR Cancellation is performed to evaluate fixation suppression prior to testing.  Dynamic Visual Acuity: {DVA:76477::\"normal.\"} DVA is performed to evaluate the VOR and visual/vestibular interaction.***  Modified Clinical Test of Sensory Interaction on Balance (mCTSIB): {mCTSIB:14370::\"normal.\"} mCTSIB is performed to evaluate balance with varying sensory information.      VIDEONYSTAGMOGRAPHY (VNG) TEST  VNG provides objective indications of peripheral and central vestibulo-ocular pathway involvement. Ocular motor testing to visually guided targets is conducted using a dual channel video-recording technique for the recording of eye movement in the horizontal and vertical planes. Air caloric testing is performed at 48 " "degrees C and 24 degrees C.    Spontaneous Nystagmus test was {SPONTANEOUSNYSTAGMUS:62550::\"absent.\"} Spontaneous nystagmus testing may help with the identification of an acute or uncompensated peripheral vestibular lesion.   Gaze Nystagmus test was {GAZENYSTAGMUS:76204::\"normal.\"} Gaze nystagmus testing is to evaluate for nystagmus that is evoked by holding eye gaze in any particular direction. True gaze nystagmus is amplified when vision is denied.   Random Saccades test was {OCULOMOTORS:60095::\"normal.\"} Random saccade testing is to evaluate patient's ability to make fast random eye movements along a horizontal moving target.   Smooth Pursuit/Tracking test was {OCULOMOTORS:34216::\"normal.\"} Smooth pursuit/tracking testing is to evaluate the ability to move eyes with a single smoothly moving target.   Optokinetic nystagmus testing was {OCULOMOTORS:32858::\"normal.\"} This full-field OPK test is to evaluate the ability of central nervous system to stabilize vision during sustained head movement after the VOR system loses effectiveness.   [place]-Hallpike testing was {BPPV:19051::\"normal.\"} Lucia-Hallpike testing is to provide a diagnosis of Benign Paroxysmal Positional Vertigo (BPPV) of the vertical semicircular canals on the side which is most affected.  Roll testing was {BPPV:27699::\"normal.\"} Roll testing is to provide a diagnosis of Benign Paroxysmal Positional Vertigo (BPPV) of the horizontal semicircular canals on the side which is most affected.  Positional testing was {POSITIONALNYSTAGMUS:24279::\"normal.\"} Positional testing is to evaluate patient's ability to hold a steady gaze while in different positions.  {CALORICS:82243} caloric testing was {CALORICS2:77066}. Unilateral weakness of ***% to the *** which is {NORMAL/ABNORMAL:53546} and a directional preponderance of ***% to the *** which is {NORMAL/ABNORMAL:38718}. Caloric testing is to evaluate for peripheral vestibular lesion.    NOTE: monothermal caloric " "testing is indicated when:  slow phase velocity of the nystagmus is 8 degrees/second or greater  less than 15% difference in the degree of nystagmus between the ears  spontaneous or positional nystagmus observed during testing is less than 5 degrees/second      VIDEO HEAD IMPULSE TEST (vHIT)  The vHIT procedure provides objective assessment of the high frequency vestibulo-ocular reflex (VOR) for each semicircular canal. Rapid, random horizontal and vertical thrusts are applied to the patient's head to provoke the VOR. The vHIT procedure includes two separate paradigms: Head Impulse Paradigm (HIMP) and Suppression Head Impulse Paradigm (SHIMP). SHIMP is an optional paradigm that is not appropriate to perform for every patient. However, it is appropriate to perform SHIMP when there is verified evidence of possible vestibulopathy in the traditional HIMP test.     Head Impulse Paradigm (HIMP)   Right Ear   Canal Gain Overt Saccades Covert Saccades   Lateral *** absent absent   Anterior *** absent absent   Posterior *** absent absent        Head Impulse Paradigm (HIMP)   Left Ear   Canal Gain Overt Saccades Covert Saccades   Lateral *** absent absent   Anterior *** absent absent   Posterior *** absent absent     {VHITGAIN:66361::\"Total gain for all canals tested was within normal limits \"} (<0.80 is abnormal for lateral, <0.70 is abnormal for vertical).  {HIMPSACCADES:48610::\"There was no evidence of overt or covert saccades throughout testing\"}      Suppression Head Impulse Paradigm (SHIMP)    Canal Gain Corrective Anti-Compensatory Saccades   Right Lateral *** present   Left Lateral *** present     {VHITGAIN:18118::\"Total gain for all canals tested was within normal limits \"} (<0.80 is abnormal for lateral).  {SHIMPSACCADES:80901::\"There was evidence of appropriate corrective anti-compensatory saccades throughout testing.\"}      CERVICAL VESTIBULAR EVOKED MYOGENIC POTENTIALS (cVEMP)  The cVEMP procedure is an evoked " "potential used to test the saccule and its afferent pathway. An asymmetry ratio is utilized to determine side of lesion. The cVEMP was recorded with the patient cervical extension to produce isolated contraction of the ipsilateral sternocleidomastoid (SCM) muscle. The cVEMP was recorded using a 500 Hz tone burst or 1000 Hz tone burst at a rate of 5.1.      Ear Presentation Level Amplitude P1 Latency N1 Latency  Amplitude Asymmetry Ratio   Right *** dB nHL *** µV *** ms *** ms ***%   Left *** dB nHL *** µV *** ms *** ms       Superior Canal Dehiscence Screening (75 dB nHL): Negative bilaterally    {cVEMP:58935::\"Replicable cVEMP responses were within normal limits, bilaterally. \"}The amplitude asymmetry ratio of ***% was not significant (>33% = abnormal).        OCULAR VESTIBULAR EVOKED MYOGENIC POTENTIALS (oVEMP)  The oVEMP procedure is an evoked potential used to test the utricle and its afferent pathway. An asymmetry ratio is utilized to determine side of lesion. This is a contralateral recording. The oVEMP was recorded with the patient seated upright with eyes tilted upward to produce isolated contraction of the contralateral inferior oblique muscle. The oVEMP were recorded using a 500 Hz, 2000 Hz, 4000 Hz tone burst at a rate of 5.1.       Ear Presentation Level Amplitude N1 Latency  P1 Latency  Amplitude Asymmetry Ratio   Right *** dB nHL *** µV *** ms *** ms ***%   Left *** dB nHL *** µV *** ms *** ms       Meniere's Disease Screening (2000 Hz): Negative bilaterally  Superior Canal Dehiscence Screening (4000 Hz): Negative bilaterally    {oVEMP:31420::\"Replicable oVEMP responses were within normal limits, bilaterally. \"} The amplitude asymmetry ratio of ***% was not significant (>33% = abnormal).      Raw data reviewed by a member of the Vestibular & Balance Disorders team. Testing and interpretation of results completed by MYA Bowser, CCC-A. It was my pleasure to evaluate this patient.     Additional " Attendees: ***    Iza Bowser, CCC-A CCVR  Senior Clinical Vestibular Audiologist

## 2024-09-03 NOTE — PROGRESS NOTES
"    ADULT AUDIOMETRIC EVALUATION      Name:  Villa Avila  :  1995  Age:  29 y.o.  Date of Evaluation:  ***    IMPRESSIONS     Today's test results are consistent with ***. Discussed results and recommendations with patient.  Questions were addressed and the patient was encouraged to contact our department should concerns arise.    RECOMMENDATIONS     Continue medical follow up with PCP/ENT.  Return for evaluation following any medical management.     Time: ***    HISTORY     Villa Avila is seen today in conjunction with Balance Function Test (BFT) appointment. Please refer to same day provider note for full case history.    TEST RESULTS     Otoscopic Evaluation:  Physical exam to evaluate the outer ear  Right Ear: {CERUMEN:30472}  Left Ear: {CERUMEN:42078}    Tympanometry & Acoustic Reflexes:  Assesses the function of the middle ear and inner ear structures  Right Ear: {TYMPSC:02034::\"Tympanometry revealed normal ear canal volume, peak pressure, and compliance, consistent with normal middle ear function (Type A).\"} {REFLEXSC:10737::\"Ipsilateral Acoustic Reflexes were present *** Hz.\"} {Retro (Optional):79319}  Left Ear: {TYMPSC:94251::\"Tympanometry revealed normal ear canal volume, peak pressure, and compliance, consistent with normal middle ear function (Type A).\"} {REFLEXSC:12456::\"Ipsilateral Acoustic Reflexes were present *** Hz.\"} {Retro (Optional):49507}    Distortion Product Otoacoustic Emissions: Assesses the cochlear outer hair cell function (0746-8751 Hz frequency range)  Right Ear: {OAE:16495}  Left Ear:  {OAE:49185}    Pure Tone Audiometry: {METHODSC:91735} via {TRANSDUCERSC:95749} with {RELIABLESC:06491} reliability  Right Ear: ***  Left Ear: ***    Speech Audiometry:   Right Ear: {AUD SRT/SAT:05901}. Speech reception threshold in agreement with pure tone average. Word Recognition scores were {DESC; EXCELLENT/GOOD/FAIR:22994} (***%) in quiet when words were presented at *** dBHL. " Pharmacy Empiric Dose Change Per Policy - vancomycin  Original Dose Ordered: intermittent dosing  Dose Changed To: 1500 mg IV q24 hours. Will schedule 24 hours after the start of CRRT.  This dose change was based on the pharmacist's assessment of this patient's age, weight, concurrent drug therapy, treatment goals, whether patient's creatinine clearance adequately indicates renal function (factoring in age, muscle mass, fluid and clinical status), and, if applicable, prior pharmacokinetic data.    Will continue to follow and modify dosage according to levels, organ function and clinical condition    Steve Lopez, PharmD, BCCCP       Left Ear: {IZA SRT/SAT:50819}. Speech reception threshold in agreement with pure tone average. Word Recognition scores were {DESC; EXCELLENT/GOOD/FAIR:94415} (***%) in quiet when words were presented at *** dBHL.       Testing and interpretation of results completed Iza Bowser CCC-A CCVR. It was my pleasure to evaluate this patient.       IZA Bowser, CCC-A CCVR  Senior Clinical Vestibular Audiologist    Degree of Hearing Sensitivity Decibel Range   Within Normal Limits (WNL) 0-25   Mild 26-40   Moderate 41-55   Moderately-Severe 56-70   Severe 71-90   Profound 91+      Key   CNT/DNT Could Not Test/Did Not Test   TM Tympanic Membrane   WNL Within Normal Limits   HA Hearing Aid   SNHL Sensorineural Hearing Loss   CHL Conductive Hearing Loss   NIHL Noise-Induced Hearing Loss   ECV Ear Canal Volume   RE/LE Right Ear/Left Ear        AUDIOGRAM

## 2024-09-13 ENCOUNTER — APPOINTMENT (OUTPATIENT)
Dept: AUDIOLOGY | Facility: CLINIC | Age: 29
End: 2024-09-13
Payer: COMMERCIAL

## 2024-11-11 ENCOUNTER — CLINICAL SUPPORT (OUTPATIENT)
Dept: AUDIOLOGY | Facility: HOSPITAL | Age: 29
End: 2024-11-11
Payer: COMMERCIAL

## 2024-11-11 DIAGNOSIS — R42 VERTIGO: ICD-10-CM

## 2024-11-11 PROCEDURE — 92519 VEMP TST I&R CERVICAL&OCULAR: CPT

## 2024-11-11 PROCEDURE — 92537 CALORIC VSTBLR TEST W/REC: CPT

## 2024-11-11 PROCEDURE — 92557 COMPREHENSIVE HEARING TEST: CPT

## 2024-11-11 PROCEDURE — 92550 TYMPANOMETRY & REFLEX THRESH: CPT

## 2024-11-11 PROCEDURE — 92540 BASIC VESTIBULAR EVALUATION: CPT

## 2024-11-11 NOTE — PROGRESS NOTES
ADULT BALANCE FUNCTION TEST (BFT)      Name:  Villa Avila  :  1995  Age:  29 y.o.  Date of Evaluation:  2024    IMPRESSIONS:    Overall normal vestibular examination; no evidence of active vestibular system involvement to account for patient reported symptoms, although clinician suspicion of vestibular migraine (VM) due to history of headaches concurrent with dizziness or cervicogenic dizziness due to patient reported symptoms of dizziness with neck extension and flexion.    There were no indications of peripheral or central vestibular system pathway involvement. There were no indications of active Benign Paroxysmal Positional Vertigo (BPPV) present. Normal observation of gait and transfers. Bedside postural control findings demonstrate normal functional balance with varying sensory information measured on the mCTSIB. Patient's risk of falling based on today's evaluation is low.    It should be noted today's peripheral examination is limited to the superior vestibular nerve, inferior vestibular nerve, horizontal semicircular canals, and otolith organs; cannot rule out vertical semicircular canal involvement. Further vestibular evaluation may be warranted to definitively rule out other causes for the patient's symptoms (i.e. Video Head Impulse Testing).    RECOMMENDATIONS:  Consider re-evaluation as medically indicated.  Maintain a healthy lifestyle to help body function overall.  Continue monitoring vestibular evaluation per ENT/PCP preference.  Continue with physical therapy as directed.     Time: 09:20-11:00     HISTORY:  Patient was seen for Balance Function Testing (BFT) due to a history of dizziness/imbalance. Vestibular case history collected via patient-clinician interview, patient chart review, and patient questionnaires.     GENERAL CASE HISTORY:   Change in Hearing: denied  Tinnitus:   Yes, bilaterally; constant, non-pulsatile, and described as ringing sensation  Otalgia:  denied  Aural Pressure/Fullness: denied  Recent Ear Infections/Otorrhea: denied  Dizziness:  Yes, see further case history below  History of Ear Surgeries: denied  History of Noise Exposure:   Yes, occupational noise exposure (works as a ), hearing protection is reportedly worn  Hearing Aid Use: none  Falls within the last year: denied     VESTIBULAR CASE HISTORY:   Patient reported history of dizziness described as true vertigo and rocking sensations, waxing and waning in severity.  Symptoms began June 30th, 2024. He reported that he awoke in the morning to the sensation.   Episodes occur daily and last anywhere from minutes to hours before subsiding.   Most recent episode occurred yesterday/today - feeling is pretty continuous.  Associated symptoms include headaches and slight nausea.  Symptoms are provoked by occasional head and body movements.  Symptoms are alleviated by laying down and resting.  Overall the patient's symptoms have gotten better over time.  This patient has had a total of 0 falls due to their symptoms.   Denied any symptoms provoked by sneezing or coughing, as well as any presence of autophony.   Denied any recent medication changes. Of note, was prescibed Prednisone and nasal spray by his PCP one week after the initial onset, but no relief occurred.   Relevant medical history includes history of chronic headaches and significant neck tension.   Patient reported complying with pre-test instructions.    EVALUATION:  Bedside Assessment Test  The bedside assessment is an optional portion of the test battery to further assist in differential diagnosis and screen for eye abnormalities which may affect testing.    Otoscopy: clear ear canals.  Extra-Ocular Range of Motion: normal.Extra-Ocular Range of Motion is performed to evaluate any eye abnormalities prior to testing.  Cover/Uncover: normal. Cover/Uncover test is performed to evaluate for skewed deviation of the eyes prior to  testing.  Cervical Neck Exam: normal. Cervical Neck is performed to evaluate any restrictions or pain with neck movement prior to testing. Of note, neck tension towards left and right directions.   Vertebral Artery Screen: normal. Vertebral Artery Screen is performed to evaluate for vertebrobasilar insufficiency prior to testing.   Ocular Counter-Roll: normal. Ocular Counter-Roll is performed to evaluate ocular tilt reaction and assess otolith organ function prior to testing.  VOR Cancellation: normal. VOR Cancellation is performed to evaluate fixation suppression prior to testing.  Modified Clinical Test of Sensory Interaction on Balance (mCTSIB): normal. mCTSIB is performed to evaluate balance with varying sensory information.  Head Impulse Test (HIT): normal. Head Impulse Test is performed to evaluate high frequency VOR prior to testing.    Videonystagmography (VNG) Test:  VNG provides objective indications of peripheral and central vestibulo-ocular pathway involvement. Ocular motor testing to visually guided targets is conducted using a dual channel video-recording technique for the recording of eye movement in the horizontal and vertical planes. Air caloric testing is performed at 48 degrees C and 24 degrees C.    Spontaneous Nystagmus test was absent. Spontaneous nystagmus testing may help with the identification of an acute or uncompensated peripheral vestibular lesion.   Gaze Nystagmus test was normal. Gaze nystagmus testing is to evaluate for nystagmus that is evoked by holding eye gaze in any particular direction. True gaze nystagmus is amplified when vision is denied.   Random Saccades test was normal. Random saccade testing is to evaluate patient's ability to make fast random eye movements along a horizontal moving target.   Smooth Pursuit/Tracking test was normal. Smooth pursuit/tracking testing is to evaluate the ability to move eyes with a single smoothly moving target.   Optokinetic nystagmus testing  was normal. This full-field OPK test is to evaluate the ability of central nervous system to stabilize vision during sustained head movement after the VOR system loses effectiveness.   [place]-Hallpike testing was normal, however patient reported slight dizziness with neck extension in both right and left directions. Lucia-Hallpike testing is to provide a diagnosis of Benign Paroxysmal Positional Vertigo (BPPV) of the vertical semicircular canals on the side which is most affected.  Roll testing was normal, however patient reported slight dizziness with neck extension in both right and left directions. Roll testing is to provide a diagnosis of Benign Paroxysmal Positional Vertigo (BPPV) of the horizontal semicircular canals on the side which is most affected.  Positional testing was normal, however patient reported slight dizziness with neck extension in both right and left directions. Positional testing is to evaluate patient's ability to hold a steady gaze while in different positions.  [place] caloric testing was normal. Unilateral weakness of 10% to the left which is normal and a directional preponderance of 4% to the right which is normal. Caloric testing is to evaluate for peripheral vestibular lesion.    NOTE: monothermal caloric testing is indicated when:  slow phase velocity of the nystagmus is 8 degrees/second or greater  less than 15% difference in the degree of nystagmus between the ears  spontaneous or positional nystagmus observed during testing is less than 5 degrees/second    Cervical Vestibular Evoked Myogenic Potential (cVEMP) Test:  The cVEMP procedure is an evoked potential used to test the saccule and its afferent pathway. An asymmetry ratio is utilized to determine side of lesion. The cVEMP was recorded with the patient cervical extension to produce isolated contraction of the ipsilateral sternocleidomastoid (SCM) muscle. The cVEMP was recorded using a 500 Hz tone burst or 1000 Hz tone burst at a  rate of 5.1.      Ear Presentation Level Amplitude P1 Latency N1 Latency  Amplitude Asymmetry Ratio   Right 95 dB nHL 80.45 µV 14.33 ms 20.00 ms Could not calculate due to threshold difference   Left 100 dB nHL 90.06 µV 15.67 ms 21.67 ms       Replicable cVEMP responses were within normal limits, bilaterally. The amplitude asymmetry ratio could not be calculated due to threshold difference.     Superior Canal Dehiscence Screening (75 dB nHL): Negative in the right ear; did not test in left ear due to absent responses at 95 dB HL.       Ocular Vestibular Evoked Myogenic Potential (oVEMP) Test:  The oVEMP procedure is an evoked potential used to test the utricle and its afferent pathway. An asymmetry ratio is utilized to determine side of lesion. This is a contralateral recording. The oVEMP was recorded with the patient seated upright with eyes tilted upward to produce isolated contraction of the contralateral inferior oblique muscle. The oVEMP were recorded using a 500 Hz, 2000 Hz, 4000 Hz tone burst at a rate of 5.1.       Ear Presentation Level Amplitude N1 Latency  P1 Latency  Amplitude Asymmetry Ratio   Right 95 dB nHL 7.39 µV 14.67 ms 18.33 ms 13%   Left 95 dB nHL 5.64 µV 14.67 ms 18.67 ms       Replicable oVEMP responses were within normal limits, bilaterally.  The amplitude asymmetry ratio of 13% was not significant (>33% = abnormal).     Meniere's Disease Screening (2000 Hz): Negative, bilaterally  Superior Canal Dehiscence Screening (4000 Hz): Negative, bilaterally      Raw data reviewed by a member of the Vestibular & Balance Disorders team. Testing and interpretation of results completed by MYA Pugh CCC-RADHA. It was my pleasure to evaluate this patient.     MYA Pugh, CCC-A  Licensed Clinical Audiologist

## 2024-11-11 NOTE — PROGRESS NOTES
AUDIOLOGY ADULT EVALUATION      Name:  Villa Avila   :  1995  Age:  29 y.o.  Date of Evaluation:  2024    Time: 08:49-09:09    IMPRESSIONS     Today's testing revealed normal middle ear functioning, normal hearing sensitivity 125-8000 Hz, and excellent word understanding in both ears. Vestibular testing immediately following this appointment.     RECOMMENDATIONS     Continue medical follow up with primary care provider and/or Ears Nose and Throat (ENT) provider as recommended.    HISTORY     Mr. Avila, 29 y.o., was seen today for an initial audiologic evaluation at the request of Renan Mercedes MD, due to concerns for dizziness.   History obtained from patient report and chart review.     GENERAL CASE HISTORY:   Change in Hearing: denied  Tinnitus:   Yes, bilaterally; constant, non-pulsatile, and described as ringing sensation  Otalgia: denied  Aural Pressure/Fullness: denied  Recent Ear Infections/Otorrhea: denied  Dizziness:  Yes, see further case history below  History of Ear Surgeries: denied  History of Noise Exposure:   Yes, occupational noise exposure (works as a ), hearing protection is reportedly worn  Hearing Aid Use: none  Falls within the last year: denied    VESTIBULAR CASE HISTORY:   Patient reported history of dizziness described as true vertigo and rocking sensations, waxing and waning in severity.  Symptoms began 2024. He reported that he awoke in the morning to the sensation.   Episodes occur daily and last anywhere from minutes to hours before subsiding.   Most recent episode occurred yesterday/today - feeling is pretty continuous.  Associated symptoms include headaches and slight nausea.  Symptoms are provoked by occasional head and body movements.  Symptoms are alleviated by laying down and resting.  Overall the patient's symptoms have gotten better over time.  This patient has had a total of 0 falls due to their symptoms.   Denied any symptoms provoked by  sneezing or coughing, as well as any presence of autophony.   Denied any recent medication changes. Of note, was prescibed Prednisone and nasal spray by his PCP one week after the initial onset, but no relief occurred.   Relevant medical history includes history of chronic headaches and significant neck tension.   Patient reported complying with pre-test instructions.    EVALUATION          TEST RESULTS     Otoscopic Evaluation:  Right Ear: Ear canal clear, tympanic membrane visualized.  Left Ear: Ear canal clear, tympanic membrane visualized.    Tympanometry (226 Hz): This test is an objective evaluation of middle ear function. CPT code: 50549   Right Ear: Type A, middle ear pressure and tympanic membrane compliance within normal limits.   Left Ear: Type A, middle ear pressure and tympanic membrane compliance within normal limits.     Acoustic Reflexes: This test is an objective measure of auditory and facial nerve pathways.   (Probe) Right Ear (ipsi right stimulus ear; contralateral left stimulus ear): (Ipsilateral) Responses present at expected levels for 500-4000 Hz.  (Probe) Left Ear (ipsi left stimulus ear; contralateral right stimulus ear):  (Ipsilateral) Responses present at expected levels for 500-4000 Hz.    Distortion Product Otoacoustic Emissions (DPOAE): This test is a measurement of responses which are generated by the cochlea when it is simultaneously stimulated by two pure tone frequencies. CPT code: 71742   Right Ear: Essentially present. Responses present at 750-4000 Hz. Response(s) absent at 3596-0864 Hz.  Left Ear:  Essentially present. Responses present at 750-4000 Hz. Response(s) absent at 1168-2952 Hz.  Present OAEs suggest normal or near cochlear outer hair cell function for corresponding frequency region(s). Absent OAEs with normal middle ear function can be consistent with some degree of hearing loss. Assessment of cochlear outer hair cell function may be impacted by outer or middle ear  function.    Test technique: Conventional Audiometry via headphones. This test is an evaluation hearing sensitivity via air and bone conduction and speech recognition testing. CPT code: 16653  Reliability:  good    Pure Tone Audiometry:     Right Ear: Hearing sensitivity within normal limits for 125-8000 Hz.  Left Ear: Hearing sensitivity within normal limits for 125-8000 Hz.    Speech Audiometry:   Right Ear: Speech Reception Threshold (SRT) was obtained at 10 dB HL. This is in good agreement with three frequency Pure Tone Average.   Word Recognition scores were excellent (100%) in quiet when words were presented at 50 dB HL. These results are based on [facility] Auditory Test No.6 (NU-6) Ordered by difficulty (N=10).  Left Ear:  Speech Reception Threshold (SRT) was obtained at 10 dB HL. This is in good agreement with three frequency Pure Tone Average.   Word Recognition scores were excellent (100%) in quiet when words were presented at 50 dB HL. These results are based on [facility] Auditory Test No.6 (NU-6) Ordered by difficulty (N=10).     Comparison of today's results with previous test results: No previous results available.          MYA Pugh, CCC-A  Licensed Clinical Audiologist  Subdivision Lead Audiologist - [facility]     Degree of   Hearing Sensitivity dB Range   Within Normal Limits (WNL) 0 - 20   Slight 25   Mild 26 - 40   Moderate 41 - 55   Moderately-Severe 56 - 70   Severe 71 - 90   Profound 91 +     Key   CHL Conductive Hearing Loss   ECV Ear Canal Volume   HA Hearing Aid   NIHL Noise-Induced Hearing Loss   PTA Pure Tone Average   SNHL Sensorineural Hearing Loss   TM Tympanic Membrane   WNL Within Normal Limits

## 2024-11-21 ENCOUNTER — TELEPHONE (OUTPATIENT)
Dept: PRIMARY CARE | Facility: CLINIC | Age: 29
End: 2024-11-21
Payer: COMMERCIAL

## 2025-01-17 ENCOUNTER — OFFICE VISIT (OUTPATIENT)
Dept: PRIMARY CARE | Facility: CLINIC | Age: 30
End: 2025-01-17
Payer: COMMERCIAL

## 2025-01-17 VITALS
HEART RATE: 78 BPM | BODY MASS INDEX: 31.35 KG/M2 | OXYGEN SATURATION: 99 % | WEIGHT: 219 LBS | DIASTOLIC BLOOD PRESSURE: 76 MMHG | SYSTOLIC BLOOD PRESSURE: 130 MMHG | HEIGHT: 70 IN

## 2025-01-17 DIAGNOSIS — M54.2 CERVICAL PAIN: ICD-10-CM

## 2025-01-17 DIAGNOSIS — R00.2 PALPITATIONS: ICD-10-CM

## 2025-01-17 DIAGNOSIS — R42 DIZZINESS: Primary | ICD-10-CM

## 2025-01-17 DIAGNOSIS — R51.9 CHRONIC DAILY HEADACHE: ICD-10-CM

## 2025-01-17 PROCEDURE — 3008F BODY MASS INDEX DOCD: CPT | Performed by: PHYSICIAN ASSISTANT

## 2025-01-17 PROCEDURE — 1036F TOBACCO NON-USER: CPT | Performed by: PHYSICIAN ASSISTANT

## 2025-01-17 PROCEDURE — 99214 OFFICE O/P EST MOD 30 MIN: CPT | Performed by: PHYSICIAN ASSISTANT

## 2025-01-17 ASSESSMENT — ENCOUNTER SYMPTOMS: HEADACHES: 1

## 2025-01-17 ASSESSMENT — PAIN SCALES - GENERAL: PAINLEVEL_OUTOF10: 5

## 2025-01-17 NOTE — PROGRESS NOTES
"Subjective   Patient ID: Villa Avila is a 29 y.o. male who presents for Headache.    Villa presents for follow up - has continued with headaches when he wakes up - throbbing in back of head and pounding in chest.   Continues with intermittent headaches and dizziness. Continues with neck pain as well.   MRI from 8/23:   IMPRESSION:  Mild cerebellar tonsillar ectopia 0.3 cm of the right cerebellar  tonsil and borderline low-lying left cerebellar tonsil, not meeting  criteria for Arnold Chiari malformation. While favored to be  incidental, integration of the clinical context, physical  exam/laboratory findings, and imaging is recommended.      Also had vestibular testing in November not available.         Headache          Review of Systems   Neurological:  Positive for headaches.   All other systems reviewed and are negative.      Objective   /76   Pulse 78   Ht 1.778 m (5' 10\")   Wt 99.3 kg (219 lb)   SpO2 99%   BMI 31.42 kg/m²     Physical Exam  Constitutional:       Appearance: Normal appearance.   HENT:      Right Ear: Tympanic membrane normal.      Left Ear: Tympanic membrane normal.      Nose: Nose normal.      Mouth/Throat:      Pharynx: Oropharynx is clear.   Eyes:      Pupils: Pupils are equal, round, and reactive to light.      Comments: L sided horiz nystag   Cardiovascular:      Rate and Rhythm: Normal rate and regular rhythm.      Heart sounds: Normal heart sounds.   Pulmonary:      Breath sounds: Normal breath sounds.   Musculoskeletal:      Cervical back: Normal range of motion and neck supple. No rigidity or tenderness.   Neurological:      Mental Status: He is alert and oriented to person, place, and time.      Motor: No weakness.      Coordination: Coordination abnormal.      Gait: Gait normal.      Deep Tendon Reflexes: Reflexes normal.   Psychiatric:         Mood and Affect: Mood normal.         Behavior: Behavior normal.         Assessment/Plan   Diagnoses and all orders for this " visit:  Dizziness  -     Comprehensive Metabolic Panel; Future  -     CBC; Future  -     Lipid Panel; Future  -     Thyroid Stimulating Hormone; Future  -     Holter or Event Cardiac Monitor; Future  -     Referral to Neurology; Future  Cervical pain  -     Comprehensive Metabolic Panel; Future  -     CBC; Future  -     Lipid Panel; Future  -     Thyroid Stimulating Hormone; Future  -     Referral to Neurology; Future  Chronic daily headache  -     Comprehensive Metabolic Panel; Future  -     CBC; Future  -     Lipid Panel; Future  -     Thyroid Stimulating Hormone; Future  -     Referral to Neurology; Future  Palpitations  -     Holter or Event Cardiac Monitor; Future

## 2025-02-22 LAB
ALBUMIN SERPL-MCNC: 4.9 G/DL (ref 3.6–5.1)
ALP SERPL-CCNC: 57 U/L (ref 36–130)
ALT SERPL-CCNC: 44 U/L (ref 9–46)
ANION GAP SERPL CALCULATED.4IONS-SCNC: 9 MMOL/L (CALC) (ref 7–17)
AST SERPL-CCNC: 30 U/L (ref 10–40)
BILIRUB SERPL-MCNC: 0.8 MG/DL (ref 0.2–1.2)
BUN SERPL-MCNC: 14 MG/DL (ref 7–25)
CALCIUM SERPL-MCNC: 9.9 MG/DL (ref 8.6–10.3)
CHLORIDE SERPL-SCNC: 102 MMOL/L (ref 98–110)
CHOLEST SERPL-MCNC: 197 MG/DL
CHOLEST/HDLC SERPL: 3.3 (CALC)
CO2 SERPL-SCNC: 28 MMOL/L (ref 20–32)
CREAT SERPL-MCNC: 0.95 MG/DL (ref 0.6–1.26)
EGFRCR SERPLBLD CKD-EPI 2021: 110 ML/MIN/1.73M2
ERYTHROCYTE [DISTWIDTH] IN BLOOD BY AUTOMATED COUNT: 12.6 % (ref 11–15)
GLUCOSE SERPL-MCNC: 91 MG/DL (ref 65–99)
HCT VFR BLD AUTO: 46 % (ref 38.5–50)
HDLC SERPL-MCNC: 59 MG/DL
HGB BLD-MCNC: 15.2 G/DL (ref 13.2–17.1)
LDLC SERPL CALC-MCNC: 127 MG/DL (CALC)
MCH RBC QN AUTO: 30.8 PG (ref 27–33)
MCHC RBC AUTO-ENTMCNC: 33 G/DL (ref 32–36)
MCV RBC AUTO: 93.1 FL (ref 80–100)
NONHDLC SERPL-MCNC: 138 MG/DL (CALC)
PLATELET # BLD AUTO: 396 THOUSAND/UL (ref 140–400)
PMV BLD REES-ECKER: 11.2 FL (ref 7.5–12.5)
POTASSIUM SERPL-SCNC: 4.7 MMOL/L (ref 3.5–5.3)
PROT SERPL-MCNC: 7.2 G/DL (ref 6.1–8.1)
RBC # BLD AUTO: 4.94 MILLION/UL (ref 4.2–5.8)
SODIUM SERPL-SCNC: 139 MMOL/L (ref 135–146)
TRIGL SERPL-MCNC: 36 MG/DL
TSH SERPL-ACNC: 1.75 MIU/L (ref 0.4–4.5)
WBC # BLD AUTO: 5.4 THOUSAND/UL (ref 3.8–10.8)

## 2025-03-02 ENCOUNTER — OFFICE VISIT (OUTPATIENT)
Dept: URGENT CARE | Age: 30
End: 2025-03-02
Payer: COMMERCIAL

## 2025-03-02 VITALS
BODY MASS INDEX: 30.06 KG/M2 | HEIGHT: 70 IN | RESPIRATION RATE: 18 BRPM | DIASTOLIC BLOOD PRESSURE: 68 MMHG | HEART RATE: 82 BPM | TEMPERATURE: 98.2 F | OXYGEN SATURATION: 100 % | WEIGHT: 210 LBS | SYSTOLIC BLOOD PRESSURE: 108 MMHG

## 2025-03-02 DIAGNOSIS — L23.9 ALLERGIC DERMATITIS: Primary | ICD-10-CM

## 2025-03-02 PROCEDURE — 99203 OFFICE O/P NEW LOW 30 MIN: CPT | Performed by: SURGERY

## 2025-03-02 PROCEDURE — 3008F BODY MASS INDEX DOCD: CPT | Performed by: SURGERY

## 2025-03-02 PROCEDURE — 96372 THER/PROPH/DIAG INJ SC/IM: CPT | Performed by: SURGERY

## 2025-03-02 RX ORDER — PREDNISONE 20 MG/1
40 TABLET ORAL DAILY
Qty: 10 TABLET | Refills: 0 | Status: SHIPPED | OUTPATIENT
Start: 2025-03-02 | End: 2025-03-07

## 2025-03-02 RX ORDER — METHYLPREDNISOLONE SODIUM SUCCINATE 125 MG/2ML
125 INJECTION INTRAMUSCULAR; INTRAVENOUS ONCE
Status: COMPLETED | OUTPATIENT
Start: 2025-03-02 | End: 2025-03-02

## 2025-03-02 RX ADMIN — METHYLPREDNISOLONE SODIUM SUCCINATE 125 MG: 125 INJECTION INTRAMUSCULAR; INTRAVENOUS at 19:47

## 2025-03-07 NOTE — PROGRESS NOTES
Chief Complaint   Patient presents with    Rash     Rash x 1 1/2 weeks.  Patient states that he was on a heart monitor 1 1/2 weeks ago and believes the adhesive on the pads has caused the rash.       Physical Exam:     Maculopapular rash on the anterior and posterior torso, bilateral arms        Encounter Diagnosis   Name Primary?    Allergic dermatitis Yes        Medical Decision Making & Plan:   In clinic: Solumedrol   Rx: prednsione  OTC: zyrtejacque [facility] derm prn     Home        03/06/25 at 7:19 PM - Nita Torres DO

## 2025-03-14 ENCOUNTER — OFFICE VISIT (OUTPATIENT)
Dept: PRIMARY CARE | Facility: CLINIC | Age: 30
End: 2025-03-14
Payer: COMMERCIAL

## 2025-03-14 VITALS
HEIGHT: 70 IN | HEART RATE: 81 BPM | BODY MASS INDEX: 30.78 KG/M2 | WEIGHT: 215 LBS | DIASTOLIC BLOOD PRESSURE: 78 MMHG | OXYGEN SATURATION: 98 % | SYSTOLIC BLOOD PRESSURE: 112 MMHG

## 2025-03-14 DIAGNOSIS — L42 PITYRIASIS ROSEA: Primary | ICD-10-CM

## 2025-03-14 PROCEDURE — 99213 OFFICE O/P EST LOW 20 MIN: CPT

## 2025-03-14 PROCEDURE — 3008F BODY MASS INDEX DOCD: CPT

## 2025-03-14 PROCEDURE — 1036F TOBACCO NON-USER: CPT

## 2025-03-14 ASSESSMENT — ENCOUNTER SYMPTOMS
CHILLS: 0
NAUSEA: 0
FEVER: 0
VOMITING: 0

## 2025-03-14 ASSESSMENT — PAIN SCALES - GENERAL: PAINLEVEL_OUTOF10: 0-NO PAIN

## 2025-03-14 NOTE — PROGRESS NOTES
"Subjective   Patient ID: Villa Avila is a 29 y.o. male who presents for Rash.    Rash x 3-4 weeks. Started 2-3 days after wearing heart monitor. Started near heart monitor patch and spread. Now has spread across torso, back and upper thighs. Patient did go to urgent care, injection steroid, oral steroid for 5 days. Also been using benadryl and hydrocortisone, calamine lotion. Improved significantly with steroid but has not fully resolved. Mild pruitus. However does endorse first large oval patch of LUQ.                Review of Systems   Constitutional:  Negative for chills and fever.   Gastrointestinal:  Negative for nausea and vomiting.   Skin:  Positive for rash.       Objective   /78   Pulse 81   Ht 1.778 m (5' 10\")   Wt 97.5 kg (215 lb)   SpO2 98%   BMI 30.85 kg/m²     Physical Exam  Constitutional:       Appearance: Normal appearance.   Pulmonary:      Effort: Pulmonary effort is normal.   Skin:     Findings: Rash present.      Comments: Widespread circular-oval salmon pink skin patches with fine scale over torso and back   Neurological:      Mental Status: He is alert.   Psychiatric:         Mood and Affect: Mood and affect normal.         Assessment/Plan   Diagnoses and all orders for this visit:  Pityriasis rosea  Educated benign and will resolve on it's own, given info packet.        "